# Patient Record
Sex: FEMALE | Race: WHITE | Employment: OTHER
[De-identification: names, ages, dates, MRNs, and addresses within clinical notes are randomized per-mention and may not be internally consistent; named-entity substitution may affect disease eponyms.]

---

## 2023-10-16 ENCOUNTER — HOSPITAL ENCOUNTER (OUTPATIENT)
Facility: HOSPITAL | Age: 78
Setting detail: RECURRING SERIES
Discharge: HOME | End: 2023-10-19
Payer: MEDICARE

## 2023-10-16 PROCEDURE — 95992 CANALITH REPOSITIONING PROC: CPT

## 2023-10-16 PROCEDURE — 97535 SELF CARE MNGMENT TRAINING: CPT

## 2023-10-16 PROCEDURE — 97162 PT EVAL MOD COMPLEX 30 MIN: CPT

## 2023-10-16 NOTE — THERAPY EVALUATION
PHYSICAL / OCCUPATIONAL THERAPY - DAILY TREATMENT NOTE (updated )  For Eval visit    Patient Name: Princess Whiting    Date: 10/16/2023    : 1945  Insurance: Payor: MEDICARE / Plan: MEDICARE PART A AND B / Product Type: *No Product type* /      Patient  verified yes     Visit #   Current / Total 1 8-12   Time   In / Out 1130 1220   Pain   In / Out 0 0   Subjective Functional Status/Changes: See POC     TREATMENT AREA =  Other abnormalities of gait and mobility [R26.89]  Dizziness and giddiness [R42]    OBJECTIVE       20 min   Eval - untimed                      Therapeutic Procedures: Tx Min Billable or 1:1 Min (if diff from Tx Min) Procedure, Rationale, Specifics     11292 Canalith Repositioning (un-timed):  correct positional vertigo, decrease dizziness, improve balance to improve patient's ability to progress to PLOF and address remaining functional goals. Details if applicable:     10 10 34554 Self Care/Home Management (timed):  improve patient knowledge and understanding of home safety, activity modification, diagnosis/prognosis, and physical therapy expectations, procedures and progression  to improve patient's ability to progress to PLOF and address remaining functional goals.   (see flow sheet as applicable)     Details if applicable:            Details if applicable:            Details if applicable:     10 10 [facility] Totals Reminder: bill using total billable min of TIMED therapeutic procedures (example: do not include dry needle or estim unattended, both untimed codes, in totals to left)  8-22 min = 1 unit; 23-37 min = 2 units; 38-52 min = 3 units; 53-67 min = 4 units; 68-82 min = 5 units   Total Total     [x]  Patient Education billed concurrently with other procedures   [x] Review HEP    [] Progressed/Changed HEP, detail:    [] Other detail:       Objective Information/Functional Measures/Assessment    See POC    Patient will continue to benefit from skilled PT / OT services to modify
Loss WNL    Tinnitus WNL    Posture:  Sensation/Proprioception: Grossly intact  Cervical Screen:  VAST  WNL      C/S ROM WNL     UCI testing: Intact     Cervical Dizziness: none     Joint position sense: Intact    VOR: Intact, no significant findings with testing     Head Thrust: WNL  Dynamic Visual Acuity: Pre-test line_______9___________ Post test line (horizontal)________7_________ (>/= to 2+ lines)        Post test line (vertical)_________7__________ (>/= to 2+ lines)  OKN: WNL  Head Shake Without Fixation (20 sec): WNL  Key   N = Normal S = Sway  F = Fall  R = Right  L = Left         __N__         __S__            __N__           _S___          __N__           _F___          _-___     PROVOKED VERTIGO TESTING Identification of Posterior / Anterior Canal BPPV   m-Hallpike Russ: Right        WNL             Sit patient up: Right          WNL             m-Hallpike Russ: Left          WNL              Sit patient up: Left             WNL            Posterior Canal BPPV Torsion ipsilateral to the ear down and upbeat Anterior Canal BPPV Torsion ipsilateral to the ear down and downbeat    Horizontal Canal BPPV   15) Lateral Body Position                       Right  WNL                                                                    Left                               Geotropic nystagmus Y               Duration: 10 seconds                                                                                                                                                                                                                                 Vertigo Y   Nausea N      Objectively, the patient demonstrates decreased static/dynamic functional balance, diminished ambulatory balance (FGA = 22/30 points) and dizziness with ADLs (DHI = 30 points). Lateral Body Positioning POS on Left. Treated with Casani Left Ear. All other positional testing WNL. VINEET: Sway on Step 2&4 and Fall on Step 6.  A fall on step 6 of the VINEET has

## 2023-10-19 ENCOUNTER — HOSPITAL ENCOUNTER (OUTPATIENT)
Facility: HOSPITAL | Age: 78
Setting detail: RECURRING SERIES
Discharge: HOME | End: 2023-10-22
Payer: MEDICARE

## 2023-10-19 PROCEDURE — 97112 NEUROMUSCULAR REEDUCATION: CPT

## 2023-10-19 PROCEDURE — 97530 THERAPEUTIC ACTIVITIES: CPT

## 2023-10-24 ENCOUNTER — HOSPITAL ENCOUNTER (OUTPATIENT)
Facility: HOSPITAL | Age: 78
Setting detail: RECURRING SERIES
Discharge: HOME | End: 2023-10-27
Payer: MEDICARE

## 2023-10-24 PROCEDURE — 97112 NEUROMUSCULAR REEDUCATION: CPT

## 2023-10-24 PROCEDURE — 95992 CANALITH REPOSITIONING PROC: CPT

## 2023-10-24 PROCEDURE — 97530 THERAPEUTIC ACTIVITIES: CPT

## 2023-10-24 NOTE — PROGRESS NOTES
PHYSICAL / OCCUPATIONAL THERAPY - DAILY TREATMENT NOTE (updated )    Patient Name: Lorena Cabral    Date: 10/24/2023    : 1945  Insurance: Payor: MEDICARE / Plan: MEDICARE PART A AND B / Product Type: *No Product type* /        Patient  verified YES   Visit #   Current / Total 3 10   Time   In / Out 1255 140   Pain   In / Out 0 0   Subjective Functional Status/Changes: Didn't sleep well. Feel like I'm in a fog. Minimal room spinning dizziness. Ex's going well     TREATMENT AREA =  Other abnormalities of gait and mobility [R26.89]  Dizziness and giddiness [R42]    OBJECTIVE      Therapeutic Procedures: Tx Min Billable or 1:1 Min (if diff from Tx Min) Procedure, Rationale, Specifics   25  [id number] Neuromuscular Re-Education (timed):  improve balance, coordination, kinesthetic sense, posture, core stability and proprioception to improve patient's ability to develop conscious control of individual muscles and awareness of position of extremities in order to progress to PLOF and address remaining functional goals. (see flow sheet as applicable)    10  97485 Therapeutic Activity (timed):  use of dynamic activities replicating functional movements to increase ROM, strength, coordination, balance, and proprioception in order to improve patient's ability to progress to PLOF and address remaining functional goals. (see flow sheet as applicable)    10  46563 Canalith Repositioning (un-timed):  correct positional vertigo, decrease dizziness, improve balance to improve patient's ability to progress to PLOF and address remaining functional goals.    [facility] Totals Reminder: bill using total billable min of TIMED therapeutic procedures (example: do not include dry needle or estim unattended, both untimed codes, in totals to left)  8-22 min = 1 unit; 23-37 min = 2 units; 38-52 min = 3 units; 53-67 min = 4 units; 68-82 min = 5 units   Total Total     [x]  Patient Education billed concurrently

## 2023-10-27 ENCOUNTER — HOSPITAL ENCOUNTER (OUTPATIENT)
Facility: HOSPITAL | Age: 78
Setting detail: RECURRING SERIES
Discharge: HOME | End: 2023-10-30
Payer: MEDICARE

## 2023-10-27 PROCEDURE — 97530 THERAPEUTIC ACTIVITIES: CPT

## 2023-10-27 PROCEDURE — 97112 NEUROMUSCULAR REEDUCATION: CPT

## 2023-10-27 NOTE — PROGRESS NOTES
therapeutic activities interventions . (see flow sheet as applicable)     Details if applicable:            [facility] Totals Reminder: bill using total billable min of TIMED therapeutic procedures (example: do not include dry needle or estim unattended, both untimed codes, in totals to left)  8-22 min = 1 unit; 23-37 min = 2 units; 38-52 min = 3 units; 53-67 min = 4 units; 68-82 min = 5 units   Total Total     [x]  Patient Education billed concurrently with other procedures   [x] Review HEP    [] Progressed/Changed HEP, detail:    [] Other detail:       Objective Information/Functional Measures/Assessment    Progressed footing with static balance- EO  Added metronome to VSE at 70 bpm,    Able to perform recip hurdles step over with supervision    L DH: negative       Patient will continue to benefit from skilled PT / OT services to modify and progress therapeutic interventions, analyze and address functional mobility deficits, and analyze and address imbalance/dizziness to address functional deficits and attain remaining goals. Progress toward goals / Updated goals:  []  See Progress Note/Recertification  Short Term Goals: To be accomplished in 4-6  treatments:  1. Patient will report at least 25% reduction of symptoms with ADLs. - progressing; no recent dizziness  2. Patient will be independent and complaint with HEP TID to reduce imbalance and dizziness with ADLs. - progressing  3. DHI will improve to less than or equal to 20 points to demonstrate reduction of dizziness and imbalance with ADLs. Long Term Goals: To be accomplished in 10-12 treatments:  1. Patient will report at least 50% reduction of symptoms with ADLs. 2. Patient will be independent with self progression of HEP and demonstrate willingness to continue HEP after D/C to maximize/maintain gains in functional mobility.   3. Patient will improve VINEET step 2&4 to Normal and Sway on Step 6 indicating improved adaptation of the vestibular system and

## 2023-10-30 ENCOUNTER — HOSPITAL ENCOUNTER (OUTPATIENT)
Facility: HOSPITAL | Age: 78
Setting detail: RECURRING SERIES
Discharge: HOME | End: 2023-11-02
Payer: MEDICARE

## 2023-10-30 PROCEDURE — 97112 NEUROMUSCULAR REEDUCATION: CPT

## 2023-10-30 PROCEDURE — 97530 THERAPEUTIC ACTIVITIES: CPT

## 2023-10-30 NOTE — PROGRESS NOTES
PHYSICAL / OCCUPATIONAL THERAPY - DAILY TREATMENT NOTE (updated )    Patient Name: Crista Molina    Date: 10/30/2023    : 1945  Insurance: Payor: MEDICARE / Plan: MEDICARE PART A AND B / Product Type: *No Product type* /      Patient  verified YES   Visit #   Current / Total 5 10   Time   In / Out 1100 1140   Pain   In / Out 0 0   Subjective Functional Status/Changes: No dizziness since last visit, imbalance only. Working on her exercises. Leaving [employer] yesterday and with more people around, felt more imbalance. TREATMENT AREA =  Other abnormalities of gait and mobility [R26.89]  Dizziness and giddiness [R42]    OBJECTIVE        Therapeutic Procedures: Tx Min Billable or 1:1 Min (if diff from Tx Min) Procedure, Rationale, Specifics     53173 Therapeutic Exercise (timed):  increase ROM, strength, coordination, balance, and proprioception to improve patient's ability to progress to PLOF and address remaining functional goals. (see flow sheet as applicable)     Details if applicable:       25  39952 Neuromuscular Re-Education (timed):  improve balance, coordination, kinesthetic sense, posture, core stability and proprioception to improve patient's ability to develop conscious control of individual muscles and awareness of position of extremities in order to progress to PLOF and address remaining functional goals. (see flow sheet as applicable)     Details if applicable:     15  54638 Therapeutic Activity (timed):  use of dynamic activities replicating functional movements to increase ROM, strength, coordination, balance, and proprioception in order to improve patient's ability to progress to PLOF and address remaining functional goals.   (see flow sheet as applicable)     Details if applicable:       60934 Manual Therapy (timed):  decrease pain, increase ROM, increase tissue extensibility, and decrease trigger points to improve patient's ability to progress to PLOF and address remaining

## 2023-11-02 ENCOUNTER — HOSPITAL ENCOUNTER (OUTPATIENT)
Facility: HOSPITAL | Age: 78
Setting detail: RECURRING SERIES
Discharge: HOME | End: 2023-11-05
Payer: MEDICARE

## 2023-11-02 PROCEDURE — 97112 NEUROMUSCULAR REEDUCATION: CPT

## 2023-11-02 PROCEDURE — 97530 THERAPEUTIC ACTIVITIES: CPT

## 2023-11-02 NOTE — PROGRESS NOTES
Information/Functional Measures/Assessment    Progressed footing with EO/ floor, EC/ compliant  Added cone tap  Added ball circles    Metronome added to VVI       Patient will continue to benefit from skilled PT / OT services to modify and progress therapeutic interventions, analyze and address functional mobility deficits, and analyze and address imbalance/dizziness to address functional deficits and attain remaining goals. Progress toward goals / Updated goals:  []  See Progress Note/Recertification  Short Term Goals: To be accomplished in 4-6  treatments:  1. Patient will report at least 25% reduction of symptoms with ADLs. - met  2. Patient will be independent and complaint with HEP TID to reduce imbalance and dizziness with ADLs. - progressing well as noted with pt able to demonstrate   3. [address] will improve to less than or equal to 20 points to demonstrate reduction of dizziness and imbalance with ADLs--good progress as noted with pt report of improving dizziness.     PLAN  yes Continue plan of care  [x]  Upgrade activities as tolerated  []  Discharge due to :  []  Other:    Santino Phoenix, PT    11/2/2023    8:04 AM    Future Appointments   Date Time Provider [address]   11/2/2023 11:00 AM Santino Phoenix, PT [facility]   11/6/2023 11:00 AM Santino Phoenix, PT [facility]   11/9/2023 10:20 AM Santino Phoenix, PT [facility]   11/13/2023 11:00 AM Eva Sandoval PT [facility]   11/16/2023 10:20 AM Santino Phoenix PT [facility]   11/20/2023 11:00 AM Eva Sandoval, PT [facility]   11/22/2023 10:20 AM Aristides Herrera, PT [facility]   11/27/2023 11:00 AM Eva Sandoval, PT [facility]   11/30/2023 11:00 AM Aristides Herrera, PT [facility]   5/9/2024 11:00 AM Tania Ontiveros MD [facility] Jennifer Snider

## 2023-11-06 ENCOUNTER — HOSPITAL ENCOUNTER (OUTPATIENT)
Facility: HOSPITAL | Age: 78
Setting detail: RECURRING SERIES
Discharge: HOME | End: 2023-11-09
Payer: MEDICARE

## 2023-11-06 PROCEDURE — 97530 THERAPEUTIC ACTIVITIES: CPT

## 2023-11-06 PROCEDURE — 97112 NEUROMUSCULAR REEDUCATION: CPT

## 2023-11-06 PROCEDURE — 97110 THERAPEUTIC EXERCISES: CPT

## 2023-11-06 NOTE — PROGRESS NOTES
PHYSICAL / OCCUPATIONAL THERAPY - DAILY TREATMENT NOTE (updated )    Patient Name: Ashley Pearce    Date: 2023    : 1945  Insurance: Payor: MEDICARE / Plan: MEDICARE PART A AND B / Product Type: *No Product type* /      Patient  verified YES   Visit #   Current / Total 7 10   Time   In / Out 1100 1138   Pain   In / Out 0 0   Subjective Functional Status/Changes: Doing pretty well. Noticed takes a while to get balanced in the morning. Tolerating new ex's well. Friend said my walking looked better       TREATMENT AREA =  Other abnormalities of gait and mobility [R26.89]  Dizziness and giddiness [R42]    OBJECTIVE        Therapeutic Procedures: Tx Min Billable or 1:1 Min (if diff from Tx Min) Procedure, Rationale, Specifics   15  52808 Therapeutic Exercise (timed):  increase ROM, strength, coordination, balance, and proprioception to improve patient's ability to progress to PLOF and address remaining functional goals. (see flow sheet as applicable)     Details if applicable:       15  22244 Neuromuscular Re-Education (timed):  improve balance, coordination, kinesthetic sense, posture, core stability and proprioception to improve patient's ability to develop conscious control of individual muscles and awareness of position of extremities in order to progress to PLOF and address remaining functional goals. (see flow sheet as applicable)     Details if applicable:     8  57622 Therapeutic Activity (timed):  use of dynamic activities replicating functional movements to increase ROM, strength, coordination, balance, and proprioception in order to improve patient's ability to progress to PLOF and address remaining functional goals.   (see flow sheet as applicable)     Details if applicable:                 [facility] Totals Reminder: bill using total billable min of TIMED therapeutic procedures (example: do not include dry needle or estim unattended, both untimed codes, in totals to left)  8-22 min = 1

## 2023-11-09 ENCOUNTER — HOSPITAL ENCOUNTER (OUTPATIENT)
Facility: HOSPITAL | Age: 78
Setting detail: RECURRING SERIES
Discharge: HOME | End: 2023-11-12
Payer: MEDICARE

## 2023-11-09 PROCEDURE — 97112 NEUROMUSCULAR REEDUCATION: CPT

## 2023-11-09 PROCEDURE — 97530 THERAPEUTIC ACTIVITIES: CPT

## 2023-11-09 PROCEDURE — 97110 THERAPEUTIC EXERCISES: CPT

## 2023-11-09 NOTE — PROGRESS NOTES
PHYSICAL / OCCUPATIONAL THERAPY - DAILY TREATMENT NOTE (updated )    Patient Name: Ran Brannon    Date: 2023    : 1945  Insurance: Payor: MEDICARE / Plan: MEDICARE PART A AND B / Product Type: *No Product type* /      Patient  verified YES   Visit #   Current / Total 8 10   Time   In / Out 1015 1055   Pain   In / Out 0 0   Subjective Functional Status/Changes: Tired    75-80% improvement in symptoms. Don't feel dizzy very often        TREATMENT AREA =  Other abnormalities of gait and mobility [R26.89]  Dizziness and giddiness [R42]    OBJECTIVE      Therapeutic Procedures: Tx Min Billable or 1:1 Min (if diff from Tx Min) Procedure, Rationale, Specifics   15  60724 Therapeutic Exercise (timed):  increase ROM, strength, coordination, balance, and proprioception to improve patient's ability to progress to PLOF and address remaining functional goals. (see flow sheet as applicable)     Details if applicable:       15  92303 Neuromuscular Re-Education (timed):  improve balance, coordination, kinesthetic sense, posture, core stability and proprioception to improve patient's ability to develop conscious control of individual muscles and awareness of position of extremities in order to progress to PLOF and address remaining functional goals. (see flow sheet as applicable)     Details if applicable:     10  23482 Therapeutic Activity (timed):  use of dynamic activities replicating functional movements to increase ROM, strength, coordination, balance, and proprioception in order to improve patient's ability to progress to PLOF and address remaining functional goals.   (see flow sheet as applicable)     Details if applicable:               36  [facility] Totals Reminder: bill using total billable min of TIMED therapeutic procedures (example: do not include dry needle or estim unattended, both untimed codes, in totals to left)  8-22 min = 1 unit; 23-37 min = 2 units; 38-52 min = 3 units; 53-67 min = 4 units;

## 2023-11-13 ENCOUNTER — HOSPITAL ENCOUNTER (OUTPATIENT)
Facility: HOSPITAL | Age: 78
Setting detail: RECURRING SERIES
Discharge: HOME | End: 2023-11-16
Payer: MEDICARE

## 2023-11-13 PROCEDURE — 97110 THERAPEUTIC EXERCISES: CPT

## 2023-11-13 PROCEDURE — 97112 NEUROMUSCULAR REEDUCATION: CPT

## 2023-11-13 PROCEDURE — 97530 THERAPEUTIC ACTIVITIES: CPT

## 2023-11-13 NOTE — PROGRESS NOTES
PHYSICAL / OCCUPATIONAL THERAPY - DAILY TREATMENT NOTE (updated )    Patient Name: Kimberly Ray    Date: 2023    : 1945  Insurance: Payor: MEDICARE / Plan: MEDICARE PART A AND B / Product Type: *No Product type* /      Patient  verified YES   Visit #   Current / Total 9 10-12   Time   In / Out 1110 1200   Pain   In / Out 0 0   Subjective Functional Status/Changes: \"I had a hard time with the balance exercises yesterday\"    75-80% improvement in symptoms. Don't feel dizzy very often        TREATMENT AREA =  Other abnormalities of gait and mobility [R26.89]  Dizziness and giddiness [R42]    OBJECTIVE      Therapeutic Procedures: Tx Min Billable or 1:1 Min (if diff from Tx Min) Procedure, Rationale, Specifics   15  31926 Therapeutic Exercise (timed):  increase ROM, strength, coordination, balance, and proprioception to improve patient's ability to progress to PLOF and address remaining functional goals. (see flow sheet as applicable)     Details if applicable:       20  89646 Neuromuscular Re-Education (timed):  improve balance, coordination, kinesthetic sense, posture, core stability and proprioception to improve patient's ability to develop conscious control of individual muscles and awareness of position of extremities in order to progress to PLOF and address remaining functional goals. (see flow sheet as applicable)     Details if applicable:     15  01492 Therapeutic Activity (timed):  use of dynamic activities replicating functional movements to increase ROM, strength, coordination, balance, and proprioception in order to improve patient's ability to progress to PLOF and address remaining functional goals.   (see flow sheet as applicable)     Details if applicable:               48  [address] Reminder: bill using total billable min of TIMED therapeutic procedures (example: do not include dry needle or estim unattended, both untimed codes, in totals to left)  8-22 min = 1 unit; 23-37 min

## 2023-11-16 ENCOUNTER — HOSPITAL ENCOUNTER (OUTPATIENT)
Facility: HOSPITAL | Age: 78
Setting detail: RECURRING SERIES
Discharge: HOME | End: 2023-11-19
Payer: MEDICARE

## 2023-11-16 PROCEDURE — 97112 NEUROMUSCULAR REEDUCATION: CPT

## 2023-11-16 PROCEDURE — 97110 THERAPEUTIC EXERCISES: CPT

## 2023-11-16 PROCEDURE — 97530 THERAPEUTIC ACTIVITIES: CPT

## 2023-11-16 NOTE — PROGRESS NOTES
[facility]  [address] [place] [address], [address], [place], [address] [place] [address] Ph: [phone] Fx: [phone]  PHYSICAL THERAPY PROGRESS NOTE  Patient Name: Ashley Pearce : 1945   Treatment/Medical Diagnosis: Other abnormalities of gait and mobility [R26.89]  Dizziness and giddiness [R42]   Referral Source: Phuong Porter MD     Date of Initial Visit: 10/16/23 Attended Visits: 10 Missed Visits: -     SUMMARY OF TREATMENT  Patient has been seen in PT for 10 visits. Treatment has consisted of 37865 Therapeutic Exercise, [id number] Neuromuscular Re-Education, 51934 Therapeutic Activity, 68533 Self Care/Home Management, and 05617 Canalith Repositioning. Patient has also been instructed in HEP, activity modification, posture/ body mechanics. CURRENT STATUS  Patient is progressing well through this course of PT. She has met all Short Term Goals and is progressing well towards Long Term Goals. Ms. Agustin Soares denies recent \"room spinning\" vertigo and testing for BPPV has been negative. She continues to report the sense of \"being off\" but episodes are less frequent and less intense. She is continuing with HEP of adaptation and habituation activities and demonstrates compliance with this. PRIOR GOALS:  1. Patient will report at least 25% reduction of symptoms with ADLs. 2. Patient will be independent and complaint with HEP TID to reduce imbalance and dizziness with ADLs. 3. DHI will improve to less than or equal to 20 points to demonstrate reduction of dizziness and imbalance with ADLs. Status at last Eval: n/a  Current Status: 75-80%  Goal Met?  yes     2. Status at last Eval: n/a  Current Status: independent and compliant with current HEP  Goal Met?  yes    3. Status at last Eval: 30 points  Current Status: 14 points  Goal Met?  yes      New Goals to be achieved in __4__ weeks  1. Patient will report at least 85% reduction of symptoms with ADLs.   2.

## 2023-11-16 NOTE — PROGRESS NOTES
PHYSICAL / OCCUPATIONAL THERAPY - DAILY TREATMENT NOTE (updated )    Patient Name: Lorena Cabral    Date: 2023    : 1945  Insurance: Payor: MEDICARE / Plan: MEDICARE PART A AND B / Product Type: *No Product type* /      Patient  verified YES   Visit #   Current / Total 10 10-20   Time   In / Out 950 1045   Pain   In / Out 0 0   Subjective Functional Status/Changes: A little dizzy getting up this morning. That happens on occasion    [facility] tomorrow for hearing and balance test       TREATMENT AREA =  Other abnormalities of gait and mobility [R26.89]  Dizziness and giddiness [R42]    OBJECTIVE        Therapeutic Procedures: Tx Min Billable or 1:1 Min (if diff from Tx Min) Procedure, Rationale, Specifics   15  43902 Therapeutic Exercise (timed):  increase ROM, strength, coordination, balance, and proprioception to improve patient's ability to progress to PLOF and address remaining functional goals. (see flow sheet as applicable)     Details if applicable:       30  59571 Neuromuscular Re-Education (timed):  improve balance, coordination, kinesthetic sense, posture, core stability and proprioception to improve patient's ability to develop conscious control of individual muscles and awareness of position of extremities in order to progress to PLOF and address remaining functional goals. (see flow sheet as applicable)     Details if applicable:     10  10173 Therapeutic Activity (timed):  use of dynamic activities replicating functional movements to increase ROM, strength, coordination, balance, and proprioception in order to improve patient's ability to progress to PLOF and address remaining functional goals.   (see flow sheet as applicable)     Details if applicable:                 [facility] Totals Reminder: bill using total billable min of TIMED therapeutic procedures (example: do not include dry needle or estim unattended, both untimed codes, in totals to left)  8-22 min = 1 unit; 23-37 min = 2

## 2023-11-20 ENCOUNTER — HOSPITAL ENCOUNTER (OUTPATIENT)
Facility: HOSPITAL | Age: 78
Setting detail: RECURRING SERIES
Discharge: HOME | End: 2023-11-23
Payer: MEDICARE

## 2023-11-20 PROCEDURE — 97530 THERAPEUTIC ACTIVITIES: CPT

## 2023-11-20 PROCEDURE — 97112 NEUROMUSCULAR REEDUCATION: CPT

## 2023-11-20 PROCEDURE — 97110 THERAPEUTIC EXERCISES: CPT

## 2023-11-20 NOTE — PROGRESS NOTES
units; 53-67 min = 4 units; 68-82 min = 5 units   Total Total     [x]  Patient Education billed concurrently with other procedures   [x] Review HEP    [] Progressed/Changed HEP, detail:    [] Other detail:       Objective Information/Functional Measures/Assessment    Patient given updated standing balance HEP sheet. Continues to make good gains in overall balance. Struggles primarily w/ tandem and activities w/ EC. Good ankle hip strategy w/ ec's activities          Patient will continue to benefit from skilled PT / OT services to modify and progress therapeutic interventions, analyze and address functional mobility deficits, and analyze and address imbalance/dizziness to address functional deficits and attain remaining goals. Progress toward goals / Updated goals:  [x]  See Progress Note/Recertification  Short Term Goals: To be accomplished in 4-6  treatments:  Progressing towards recently updated goals:  1. Patient will report at least 85% reduction of symptoms with ADLs. 2. Patient will be independent with self progression of HEP and demonstrate willingness to continue HEP after D/C to maximize/maintain gains in functional mobility. 3. Patient will improve VINEET step 2&4 to Normal and Sway on Step 6 indicating improved adaptation of the vestibular system and improved proprioception to allow for increased safety and reduction of falls. 4. FGA will improve to greater then or equal to 28/30 points to demonstrate a significant improvement in ambulatory balance.     PLAN  yes Continue plan of care  [x]  Upgrade activities as tolerated  []  Discharge due to :  [x]  Other:laureen Sims, PT    11/20/2023    9:54 AM    Future Appointments   Date Time Provider [address] [place] [address]   11/20/2023 11:00 AM Veronica Sims PT [facility]   11/22/2023 10:20 AM Jose Inman PT [facility]   11/27/2023 11:00 AM Veronica Sims PT [facility]   11/30/2023 11:00 AM Jose Inman PT [facility]   5/9/2024 11:00 AM

## 2023-11-22 ENCOUNTER — HOSPITAL ENCOUNTER (OUTPATIENT)
Facility: HOSPITAL | Age: 78
Setting detail: RECURRING SERIES
Discharge: HOME | End: 2023-11-25
Payer: MEDICARE

## 2023-11-22 PROCEDURE — 97110 THERAPEUTIC EXERCISES: CPT

## 2023-11-22 PROCEDURE — 97112 NEUROMUSCULAR REEDUCATION: CPT

## 2023-11-22 PROCEDURE — 97530 THERAPEUTIC ACTIVITIES: CPT

## 2023-11-22 NOTE — PROGRESS NOTES
PHYSICAL / OCCUPATIONAL THERAPY - DAILY TREATMENT NOTE (updated )    Patient Name: Darrius Cho    Date: 2023    : 1945  Insurance: Payor: MEDICARE / Plan: MEDICARE PART A AND B / Product Type: *No Product type* /      Patient  verified YES   Visit #   Current / Total 12 10-20   Time   In / Out 1015 1155   Pain   In / Out 0 0   Subjective Functional Status/Changes: Pt reports she is having less symptoms overall. Still having symptoms in the morning, mostly imbalance. TREATMENT AREA =  Other abnormalities of gait and mobility [R26.89]  Dizziness and giddiness [R42]    OBJECTIVE        Therapeutic Procedures: Tx Min Billable or 1:1 Min (if diff from Tx Min) Procedure, Rationale, Specifics   10  68739 Therapeutic Exercise (timed):  increase ROM, strength, coordination, balance, and proprioception to improve patient's ability to progress to PLOF and address remaining functional goals. (see flow sheet as applicable)     Details if applicable:       20  98100 Neuromuscular Re-Education (timed):  improve balance, coordination, kinesthetic sense, posture, core stability and proprioception to improve patient's ability to develop conscious control of individual muscles and awareness of position of extremities in order to progress to PLOF and address remaining functional goals. (see flow sheet as applicable)     Details if applicable:     10  02243 Therapeutic Activity (timed):  use of dynamic activities replicating functional movements to increase ROM, strength, coordination, balance, and proprioception in order to improve patient's ability to progress to PLOF and address remaining functional goals.   (see flow sheet as applicable)     Details if applicable:               36  [facility] Totals Reminder: bill using total billable min of TIMED therapeutic procedures (example: do not include dry needle or estim unattended, both untimed codes, in totals to left)  8-22 min = 1 unit; 23-37 min = 2 units;

## 2023-11-27 ENCOUNTER — HOSPITAL ENCOUNTER (OUTPATIENT)
Facility: HOSPITAL | Age: 78
Setting detail: RECURRING SERIES
Discharge: HOME | End: 2023-11-30
Payer: MEDICARE

## 2023-11-27 PROCEDURE — 97110 THERAPEUTIC EXERCISES: CPT

## 2023-11-27 PROCEDURE — 97530 THERAPEUTIC ACTIVITIES: CPT

## 2023-11-27 PROCEDURE — 97112 NEUROMUSCULAR REEDUCATION: CPT

## 2023-11-27 NOTE — PROGRESS NOTES
PHYSICAL / OCCUPATIONAL THERAPY - DAILY TREATMENT NOTE (updated )    Patient Name: Kimberly Ray    Date: 2023    : 1945  Insurance: Payor: MEDICARE / Plan: MEDICARE PART A AND B / Product Type: *No Product type* /      Patient  verified YES   Visit #   Current / Total 13 10-20   Time   In / Out 1100 1138   Pain   In / Out 0 0   Subjective Functional Status/Changes: \"I don't know why I feel so off today. I was fine earlier in the morning\"       TREATMENT AREA =  Other abnormalities of gait and mobility [R26.89]  Dizziness and giddiness [R42]    OBJECTIVE        Therapeutic Procedures: Tx Min Billable or 1:1 Min (if diff from Tx Min) Procedure, Rationale, Specifics   8  72026 Therapeutic Exercise (timed):  increase ROM, strength, coordination, balance, and proprioception to improve patient's ability to progress to PLOF and address remaining functional goals. (see flow sheet as applicable)     Details if applicable:       20  19114 Neuromuscular Re-Education (timed):  improve balance, coordination, kinesthetic sense, posture, core stability and proprioception to improve patient's ability to develop conscious control of individual muscles and awareness of position of extremities in order to progress to PLOF and address remaining functional goals. (see flow sheet as applicable)     Details if applicable:     10  05775 Therapeutic Activity (timed):  use of dynamic activities replicating functional movements to increase ROM, strength, coordination, balance, and proprioception in order to improve patient's ability to progress to PLOF and address remaining functional goals.   (see flow sheet as applicable)     Details if applicable:               45  [facility] Totals Reminder: bill using total billable min of TIMED therapeutic procedures (example: do not include dry needle or estim unattended, both untimed codes, in totals to left)  8-22 min = 1 unit; 23-37 min = 2 units; 38-52 min = 3 units; 53-67 min =

## 2023-11-30 ENCOUNTER — HOSPITAL ENCOUNTER (OUTPATIENT)
Facility: HOSPITAL | Age: 78
Setting detail: RECURRING SERIES
End: 2023-11-30
Payer: MEDICARE

## 2023-11-30 PROCEDURE — 97112 NEUROMUSCULAR REEDUCATION: CPT

## 2023-11-30 PROCEDURE — 97530 THERAPEUTIC ACTIVITIES: CPT

## 2023-11-30 PROCEDURE — 97110 THERAPEUTIC EXERCISES: CPT

## 2023-11-30 NOTE — PROGRESS NOTES
[facility] PHYSICAL THERAPY  [address] [place] [address], [address], [address] Ph: [phone] Fx: [phone]  DISCHARGE SUMMARY FOR PHYSICAL THERAPY          Patient Name: Crista Molina : 1945   Treatment/Medical Diagnosis: Other abnormalities of gait and mobility [R26.89]  Dizziness and giddiness [R42]   Onset Date: 23    Referral Source: Shi Hensley MD [facility]): 10/16/23   Prior Hospitalization: See Medical History Provider #: [id number]   Prior Level of Function: Independent with ADLs   Comorbidities: HTN, prior surgery   Medications: Verified on Patient Summary List   Visits from [facility]: 14 Missed Visits: 0     Key Functional Changes/Progress: Pt has been seen for 14 visits and has met all LTGs. She has reported 90% improvement in symptoms. Pt FGA at . Pt [address] at 10/100. Pt VINEET testing demonstrates improved step 2 and 4. She is I with current HEP and its safe progression. 1. Patient will report at least 85% reduction of symptoms with ADLs. 2. Patient will be independent with self progression of HEP and demonstrate willingness to continue HEP after D/C to maximize/maintain gains in functional mobility. 3. Patient will improve VINEET step 2&4 to Normal and Sway on Step 6 indicating improved adaptation of the vestibular system and improved proprioception to allow for increased safety and reduction of falls. 4. FGA will improve to greater then or equal to 28/30 points to demonstrate a significant improvement in ambulatory balance. \    Status at last Eval: 50%  Current Status: 90%  Goal Met?  yes    2. Status at last Eval: independent with current HEP  Current Status: Pt program progressed and independent with detailed HEP  Goal Met?  yes    3. Status at last Eval: [place] pad issues with 2 and 4  Current Status: improved   Goal Met?  yes    4.  Status at last Eval:   Current Status:   Goal Met?  yes    Reporting Period (date from last assessment to current assessment): 10/16/23-11/30/23    RECOMMENDATIONS  Discontinue therapy. Progressing towards or have reached established goals. If you have any questions/comments please contact us directly at [phone]. Thank you for allowing us to assist in the care of your patient.     Marv Durant, PT       11/30/2023       12:08 PM

## 2023-11-30 NOTE — PROGRESS NOTES
PHYSICAL / OCCUPATIONAL THERAPY - DAILY TREATMENT NOTE (updated )    Patient Name: Lidia Vargas    Date: 2023    : 1945  Insurance: Payor: MEDICARE / Plan: MEDICARE PART A AND B / Product Type: *No Product type* /      Patient  verified YES   Visit #   Current / Total 14 10-20   Time   In / Out 1100 1140am   Pain   In / Out 0 0   Subjective Functional Status/Changes: Pt with report of 75-90% improvement in balance and symptoms. TREATMENT AREA =  Other abnormalities of gait and mobility [R26.89]  Dizziness and giddiness [R42]    OBJECTIVE    Therapeutic Procedures: Tx Min Billable or 1:1 Min (if diff from Tx Min) Procedure, Rationale, Specifics   10  35426 Therapeutic Exercise (timed):  increase ROM, strength, coordination, balance, and proprioception to improve patient's ability to progress to PLOF and address remaining functional goals. (see flow sheet as applicable)     Details if applicable:       20  70087 Neuromuscular Re-Education (timed):  improve balance, coordination, kinesthetic sense, posture, core stability and proprioception to improve patient's ability to develop conscious control of individual muscles and awareness of position of extremities in order to progress to PLOF and address remaining functional goals. (see flow sheet as applicable)     Details if applicable:     10  06963 Therapeutic Activity (timed):  use of dynamic activities replicating functional movements to increase ROM, strength, coordination, balance, and proprioception in order to improve patient's ability to progress to PLOF and address remaining functional goals.   (see flow sheet as applicable)     Details if applicable:               36  [facility] Totals Reminder: bill using total billable min of TIMED therapeutic procedures (example: do not include dry needle or estim unattended, both untimed codes, in totals to left)  8-22 min = 1 unit; 23-37 min = 2 units; 38-52 min = 3 units; 53-67 min = 4 units; 68-82

## 2025-01-10 ENCOUNTER — HOSPITAL ENCOUNTER (OUTPATIENT)
Facility: HOSPITAL | Age: 80
Setting detail: RECURRING SERIES
Discharge: HOME | End: 2025-01-13
Payer: MEDICARE

## 2025-01-10 PROCEDURE — 97161 PT EVAL LOW COMPLEX 20 MIN: CPT

## 2025-01-10 NOTE — PROGRESS NOTES
PHYSICAL / OCCUPATIONAL THERAPY - DAILY TREATMENT NOTE (updated )    Patient Name: Marisela Cortez    Date: 1/10/2025    : 1945  Insurance: Payor: MEDICARE / Plan: MEDICARE PART A AND B / Product Type: *No Product type* /      Patient  verified yes     Visit #   Current / Total 1 16   Time   In / Out 1100 1140   Pain   In / Out 0 0   Subjective Functional Status/Changes: See Eval/ POC         TREATMENT AREA =  Other abnormalities of gait and mobility [R26.89]  Dizziness and giddiness [R42]    OBJECTIVE        Objective Information/Functional Measures/Assessment: [x]  See POC    Goals:  [x]  See POC      PLAN  yes Continue plan of care  []  Upgrade activities as tolerated  []  Discharge due to :  []  Other:    Holley Barber PT    1/10/2025    8:48 AM    Future Appointments   Date Time Provider Department Center   1/10/2025 11:00 AM [facility]   2025 10:00 AM Chastity Yoder MD [facility] Chloe Sched       If an interpreting service was utilized for treatment of this patient, the contents of this document represent the material reviewed with the patient via the .

## 2025-01-10 NOTE — THERAPY EVALUATION
[facility]   [address], [address], [place] [address] Ph: [phone] Fx: [phone]  Plan of Care / Statement of Necessity for Physical Therapy Services     Patient Name: Marisela Cortez : 1945   Medical   Diagnosis: Other abnormalities of gait and mobility [R26.89]  Dizziness and giddiness [R42] Treatment Diagnosis: Other abnormalities of gait and mobility [R26.89]  Dizziness and giddiness [R42]   Onset Date: 2024     Referral Source: Rubinstein, Benjamin, MD Start of Care (SOC): 1/10/2025   Prior Hospitalization: See medical history Provider #: [id number]   Prior Level of Function: Prior episodes of vertigo/ imbalance that resolved   Comorbidities: Prior BPPV, heart disease, HTN, thyroid problem     Assessment / key information:   Marisela Cortez is a 79 y.o.  yo female with Dx of Other abnormalities of gait and mobility [R26.89]  Dizziness and giddiness [R42].  She was previously seen at this clinic for same Dx with success.  She reports dizziness and imbalance upon getting out of bed one morning in 2024.  The dizziness has not occurred again, but she continues with imbalance. Objectively, the patient demonstrates  the following:  Balance: Decreased static balance: SLS: R= 4/15 sec, L= 15/15 sec, diminished ambulatory balance assessed via Functional Gait Assessment (FGA = 19/30 points)   Dizziness with ADLs: Dizziness Handicap Inventory (DHI = 26 points).   Oculo-motor tests are WNLs    BPPV Assessment:   Hallpike-Russ (right):neg, Hallpike-Russ (left) neg, Roll Test (right):neg, Roll Test (left): neg.  Pt will benefit from PT/Vestibular rehab to address these deficits, improve functional independence and reduce dizziness and imbalance with normal daily activities. Thank you for this referral.     Evaluation Complexity:  History:  MEDIUM  Complexity : 1-2 comorbidities / personal factors will impact the outcome/ POC ; Examination:  HIGH

## 2025-01-13 ENCOUNTER — HOSPITAL ENCOUNTER (OUTPATIENT)
Facility: HOSPITAL | Age: 80
Setting detail: RECURRING SERIES
Discharge: HOME | End: 2025-01-16
Payer: MEDICARE

## 2025-01-13 PROCEDURE — 97112 NEUROMUSCULAR REEDUCATION: CPT

## 2025-01-13 PROCEDURE — 97530 THERAPEUTIC ACTIVITIES: CPT

## 2025-01-13 NOTE — PROGRESS NOTES
PHYSICAL / OCCUPATIONAL THERAPY - DAILY TREATMENT NOTE    Patient Name: Marisela Cortez    Date: 2025    : 1945  Insurance: Payor: MEDICARE / Plan: MEDICARE PART A AND B / Product Type: *No Product type* /      Patient  verified Yes     Visit #   Current / Total 2 16   Time   In / Out 1105 1145   Pain   In / Out 0 0   Subjective Functional Status/Changes: Pt states she was working on her exercises, tried ball activity and card program.     TREATMENT AREA =  Other abnormalities of gait and mobility [R26.89]  Dizziness and giddiness [R42]     OBJECTIVE        Therapeutic Procedures:    36099 Neuromuscular Re-Education (timed):  improve balance, coordination, kinesthetic sense, posture, core stability and proprioception to improve patient's ability to develop conscious control of individual muscles and awareness of position of extremities in order to progress to PLOF and address remaining functional goals.   Tx Min Billable or 1:1 Min   (if diff from Tx Min) Details:   30  See flow sheet as applicable     93625 Therapeutic Activity (timed):  use of dynamic activities replicating functional movements to increase ROM, strength, coordination, balance, and proprioception in order to improve patient's ability to progress to PLOF and address remaining functional goals.   Tx Min Billable or 1:1 Min   (if diff from Tx Min) Details:   15  See flow sheet as applicable       45  [facility] Totals Reminder: bill using total billable min of TIMED therapeutic procedures (example: do not include dry needle or estim unattended, both untimed codes, in totals to left)  8-22 min = 1 unit; 23-37 min = 2 units; 38-52 min = 3 units; 53-67 min = 4 units; 68-82 min = 5 units   Total Total     [x]  Patient Education billed concurrently with other procedures   [x] Review HEP    [] Progressed/Changed HEP, detail:    [] Other detail:       Objective Information/Functional Measures/Assessment    Pt program reviewed, including ball

## 2025-01-17 ENCOUNTER — HOSPITAL ENCOUNTER (OUTPATIENT)
Facility: HOSPITAL | Age: 80
Setting detail: RECURRING SERIES
Discharge: HOME | End: 2025-01-20
Payer: MEDICARE

## 2025-01-17 PROCEDURE — 97530 THERAPEUTIC ACTIVITIES: CPT

## 2025-01-17 PROCEDURE — 97112 NEUROMUSCULAR REEDUCATION: CPT

## 2025-01-17 NOTE — PROGRESS NOTES
PHYSICAL / OCCUPATIONAL THERAPY - DAILY TREATMENT NOTE    Patient Name: Marisela Cortez    Date: 2025    : 1945  Insurance: Payor: MEDICARE / Plan: MEDICARE PART A AND B / Product Type: *No Product type* /      Patient  verified Yes     Visit #   Current / Total 3 16   Time   In / Out 11 1140   Pain   In / Out 0 0   Subjective Functional Status/Changes: Pt states she is continuing to do exercises as recommended with good response. Noting improvements in overall balance. Notes she is fatigued this day from sleep disruption.     TREATMENT AREA =  Other abnormalities of gait and mobility [R26.89]  Dizziness and giddiness [R42]     OBJECTIVE    Therapeutic Procedures:  64150 Neuromuscular Re-Education (timed):  improve balance, coordination, kinesthetic sense, posture, core stability and proprioception to improve patient's ability to develop conscious control of individual muscles and awareness of position of extremities in order to progress to PLOF and address remaining functional goals.   Tx Min Billable or 1:1 Min   (if diff from Tx Min) Details:   30  See flow sheet as applicable     31309 Therapeutic Activity (timed):  use of dynamic activities replicating functional movements to increase ROM, strength, coordination, balance, and proprioception in order to improve patient's ability to progress to PLOF and address remaining functional goals.   Tx Min Billable or 1:1 Min   (if diff from Tx Min) Details:   10  See flow sheet as applicable--pt ed on HEP progression       40  [facility] Totals Reminder: bill using total billable min of TIMED therapeutic procedures (example: do not include dry needle or estim unattended, both untimed codes, in totals to left)  8-22 min = 1 unit; 23-37 min = 2 units; 38-52 min = 3 units; 53-67 min = 4 units; 68-82 min = 5 units   Total Total     [x]  Patient Education billed concurrently with other procedures   [x] Review HEP    [] Progressed/Changed HEP, detail:    [] Other

## 2025-01-21 ENCOUNTER — HOSPITAL ENCOUNTER (OUTPATIENT)
Facility: HOSPITAL | Age: 80
Setting detail: RECURRING SERIES
Discharge: HOME | End: 2025-01-24
Payer: MEDICARE

## 2025-01-21 PROCEDURE — 97530 THERAPEUTIC ACTIVITIES: CPT

## 2025-01-21 PROCEDURE — 97112 NEUROMUSCULAR REEDUCATION: CPT

## 2025-01-21 NOTE — PROGRESS NOTES
PHYSICAL / OCCUPATIONAL THERAPY - DAILY TREATMENT NOTE (updated )    Patient Name: Marisela Cortez    Date: 2025    : 1945  Insurance: Payor: MEDICARE / Plan: MEDICARE PART A AND B / Product Type: *No Product type* /      Patient  verified YES   Visit #   Current / Total 4 16   Time   In / Out 1143 1225   Pain   In / Out 3-4 3   Subjective Functional Status/Changes: Bent over this morning with subsequent dizziness persisting    Have a good handle on HEP       TREATMENT AREA =  Other abnormalities of gait and mobility [R26.89]  Dizziness and giddiness [R42]    OBJECTIVE      Therapeutic Procedures:  Tx Min Billable or 1:1 Min (if diff from Tx Min) Procedure, Rationale, Specifics     56324 Therapeutic Exercise (timed):  increase ROM, strength, coordination, balance, and proprioception to improve patient's ability to progress to PLOF and address remaining functional goals. (see flow sheet as applicable)     Details if applicable:       30  10580 Neuromuscular Re-Education (timed):  improve balance, coordination, kinesthetic sense, posture, core stability and proprioception to improve patient's ability to develop conscious control of individual muscles and awareness of position of extremities in order to progress to PLOF and address remaining functional goals. (see flow sheet as applicable)     Details if applicable:     12 12 72096 Therapeutic Activity (timed):  use of dynamic activities replicating functional movements to increase ROM, strength, coordination, balance, and proprioception in order to improve patient's ability to progress to PLOF and address remaining functional goals.  (see flow sheet as applicable)     Details if applicable:       53675 Manual Therapy (timed):  decrease pain, increase ROM, increase tissue extensibility, and decrease trigger points to improve patient's ability to progress to PLOF and address remaining functional goals.  The manual therapy interventions were

## 2025-01-24 ENCOUNTER — HOSPITAL ENCOUNTER (OUTPATIENT)
Facility: HOSPITAL | Age: 80
Setting detail: RECURRING SERIES
Discharge: HOME | End: 2025-01-27
Payer: MEDICARE

## 2025-01-24 PROCEDURE — 97112 NEUROMUSCULAR REEDUCATION: CPT

## 2025-01-24 PROCEDURE — 97110 THERAPEUTIC EXERCISES: CPT

## 2025-01-24 PROCEDURE — 97530 THERAPEUTIC ACTIVITIES: CPT

## 2025-01-24 NOTE — PROGRESS NOTES
PHYSICAL / OCCUPATIONAL THERAPY - DAILY TREATMENT NOTE (updated )    Patient Name: Marisela Cortez    Date: 2025    : 1945  Insurance: Payor: MEDICARE / Plan: MEDICARE PART A AND B / Product Type: *No Product type* /      Patient  verified YES   Visit #   Current / Total 5 16   Time   In / Out 1022 11   Pain   In / Out 0 0   Subjective Functional Status/Changes: Mild symptoms since last seen       TREATMENT AREA =  Other abnormalities of gait and mobility [R26.89]  Dizziness and giddiness [R42]    OBJECTIVE        Therapeutic Procedures:  Tx Min Billable or 1:1 Min (if diff from Tx Min) Procedure, Rationale, Specifics   15  58948 Therapeutic Exercise (timed):  increase ROM, strength, coordination, balance, and proprioception to improve patient's ability to progress to PLOF and address remaining functional goals. (see flow sheet as applicable)     Details if applicable:       15  82399 Neuromuscular Re-Education (timed):  improve balance, coordination, kinesthetic sense, posture, core stability and proprioception to improve patient's ability to develop conscious control of individual muscles and awareness of position of extremities in order to progress to PLOF and address remaining functional goals. (see flow sheet as applicable)     Details if applicable:     8  99355 Therapeutic Activity (timed):  use of dynamic activities replicating functional movements to increase ROM, strength, coordination, balance, and proprioception in order to improve patient's ability to progress to PLOF and address remaining functional goals.  (see flow sheet as applicable)     Details if applicable:       41019 Manual Therapy (timed):  decrease pain, increase ROM, increase tissue extensibility, and decrease trigger points to improve patient's ability to progress to PLOF and address remaining functional goals.  The manual therapy interventions were performed at a separate and distinct time from the therapeutic

## 2025-01-28 ENCOUNTER — HOSPITAL ENCOUNTER (OUTPATIENT)
Facility: HOSPITAL | Age: 80
Setting detail: RECURRING SERIES
Discharge: HOME | End: 2025-01-31
Payer: MEDICARE

## 2025-01-28 PROCEDURE — 97110 THERAPEUTIC EXERCISES: CPT

## 2025-01-28 PROCEDURE — 97530 THERAPEUTIC ACTIVITIES: CPT

## 2025-01-28 PROCEDURE — 97112 NEUROMUSCULAR REEDUCATION: CPT

## 2025-01-28 NOTE — PROGRESS NOTES
PHYSICAL / OCCUPATIONAL THERAPY - DAILY TREATMENT NOTE (updated )    Patient Name: Marisela Cortez    Date: 2025    : 1945  Insurance: Payor: MEDICARE / Plan: MEDICARE PART A AND B / Product Type: *No Product type* /      Patient  verified YES   Visit #   Current / Total 6 16   Time   In / Out 1022 1102   Pain   In / Out 0 0   Subjective Functional Status/Changes: Less symptoms with performing PT activity, noting improvements in overall activity with less imbalance. Still challenged with open spaces, walking in hallway at [employer] with people walking towards her.       TREATMENT AREA =  Other abnormalities of gait and mobility [R26.89]  Dizziness and giddiness [R42]    OBJECTIVE    Therapeutic Procedures:  Tx Min Billable or 1:1 Min (if diff from Tx Min) Procedure, Rationale, Specifics   15  01425 Therapeutic Exercise (timed):  increase ROM, strength, coordination, balance, and proprioception to improve patient's ability to progress to PLOF and address remaining functional goals. (see flow sheet as applicable)     Details if applicable:       15  61686 Neuromuscular Re-Education (timed):  improve balance, coordination, kinesthetic sense, posture, core stability and proprioception to improve patient's ability to develop conscious control of individual muscles and awareness of position of extremities in order to progress to PLOF and address remaining functional goals. (see flow sheet as applicable)     Details if applicable:     10  40951 Therapeutic Activity (timed):  use of dynamic activities replicating functional movements to increase ROM, strength, coordination, balance, and proprioception in order to improve patient's ability to progress to PLOF and address remaining functional goals.  (see flow sheet as applicable)     Details if applicable:       62687 Manual Therapy (timed):  decrease pain, increase ROM, increase tissue extensibility, and decrease trigger points to improve patient's ability

## 2025-01-31 ENCOUNTER — HOSPITAL ENCOUNTER (OUTPATIENT)
Facility: HOSPITAL | Age: 80
Setting detail: RECURRING SERIES
End: 2025-01-31
Payer: MEDICARE

## 2025-01-31 PROCEDURE — 97112 NEUROMUSCULAR REEDUCATION: CPT

## 2025-01-31 PROCEDURE — 97110 THERAPEUTIC EXERCISES: CPT

## 2025-01-31 PROCEDURE — 97530 THERAPEUTIC ACTIVITIES: CPT

## 2025-01-31 NOTE — PROGRESS NOTES
PARI KAUFMAN [facility]   [address], [address], [place] [address] Ph: [phone] Fx: [phone]  PHYSICAL THERAPY PROGRESS NOTE  Patient Name: Marisela Cortez : 1945   Treatment/Medical Diagnosis: Other abnormalities of gait and mobility [R26.89]  Dizziness and giddiness [R42]   Referral Source: Rubinstein, Benjamin, MD     Date of Initial Visit: 1/10/25 Attended Visits: 7 Missed Visits: -     SUMMARY OF TREATMENT  Patient has been seen in PT for 7 visits.  Treatment has consisted of 08138 Therapeutic Exercise, 35854 Neuromuscular Re-Education, 34532 Therapeutic Activity, and 85662 Self Care/Home Management.  Patient has also been instructed in HEP, activity modification, posture/ body mechanics.      CURRENT STATUS  Patient has progressed well through this course of PT, reporting 75-80% overall improvement in symptoms.  She has denied recent dizziness.  Balance has improved as is evident by improvement in FGA score.  All LTGs have been met.  She is independent with her HEP and expresses willingness to continue.      PRIOR GOALS:  1. Patient will report at least 50% reduction of symptoms with ADLs.  2. Patient will be independent with self progression of HEP and demonstrate willingness to continue HEP after D/C to maximize/maintain gains in functional mobility.  3. DHI will improve to less than or equal to 16 points to demonstrate significant reduction of dizziness and imbalance with ADLs.  4. FGA will improve to greater then or equal to 23/30 points to demonstrate a significant improvement in ambulatory balance.     Status at last Eval: n/a  Current Status: 75-80%  Goal Met?  yes     2.  Status at last Eval: n/a  Current Status: indep with HEP  Goal Met?  yes    3.  Status at last Eval: 26 points  Current Status: 14 points  Goal Met?  yes    4.  Status at last Eval:   Current Status:   Goal Met?  yes    RECOMMENDATIONS  Will place PT on hold.

## 2025-01-31 NOTE — PROGRESS NOTES
PHYSICAL / OCCUPATIONAL THERAPY - DAILY TREATMENT NOTE (updated )    Patient Name: Marisela Cortez    Date: 2025    : 1945  Insurance: Payor: MEDICARE / Plan: MEDICARE PART A AND B / Product Type: *No Product type* /      Patient  verified YES   Visit #   Current / Total 7 16   Time   In / Out 1017 1055   Pain   In / Out 0 0   Subjective Functional Status/Changes: See note       TREATMENT AREA =  Other abnormalities of gait and mobility [R26.89]  Dizziness and giddiness [R42]    OBJECTIVE        Therapeutic Procedures:  Tx Min Billable or 1:1 Min (if diff from Tx Min) Procedure, Rationale, Specifics   15  33214 Therapeutic Exercise (timed):  increase ROM, strength, coordination, balance, and proprioception to improve patient's ability to progress to PLOF and address remaining functional goals. (see flow sheet as applicable)     Details if applicable:       8  47256 Neuromuscular Re-Education (timed):  improve balance, coordination, kinesthetic sense, posture, core stability and proprioception to improve patient's ability to develop conscious control of individual muscles and awareness of position of extremities in order to progress to PLOF and address remaining functional goals. (see flow sheet as applicable)     Details if applicable:     15  90417 Therapeutic Activity (timed):  use of dynamic activities replicating functional movements to increase ROM, strength, coordination, balance, and proprioception in order to improve patient's ability to progress to PLOF and address remaining functional goals.  (see flow sheet as applicable)     Details if applicable:       82402 Manual Therapy (timed):  decrease pain, increase ROM, increase tissue extensibility, and decrease trigger points to improve patient's ability to progress to PLOF and address remaining functional goals.  The manual therapy interventions were performed at a separate and distinct time from the therapeutic activities interventions .